# Patient Record
Sex: MALE | URBAN - METROPOLITAN AREA
[De-identification: names, ages, dates, MRNs, and addresses within clinical notes are randomized per-mention and may not be internally consistent; named-entity substitution may affect disease eponyms.]

---

## 2018-03-02 ENCOUNTER — THERAPY VISIT (OUTPATIENT)
Dept: PHYSICAL THERAPY | Facility: CLINIC | Age: 28
End: 2018-03-02
Payer: COMMERCIAL

## 2018-03-02 DIAGNOSIS — M54.6 THORACOLUMBAR BACK PAIN: Primary | ICD-10-CM

## 2018-03-02 DIAGNOSIS — M54.50 THORACOLUMBAR BACK PAIN: Primary | ICD-10-CM

## 2018-03-02 PROCEDURE — 97161 PT EVAL LOW COMPLEX 20 MIN: CPT | Mod: GP | Performed by: PHYSICAL THERAPIST

## 2018-03-02 PROCEDURE — 97014 ELECTRIC STIMULATION THERAPY: CPT | Mod: GP | Performed by: PHYSICAL THERAPIST

## 2018-03-02 PROCEDURE — 97010 HOT OR COLD PACKS THERAPY: CPT | Mod: GP | Performed by: PHYSICAL THERAPIST

## 2018-03-02 PROCEDURE — 97110 THERAPEUTIC EXERCISES: CPT | Mod: GP | Performed by: PHYSICAL THERAPIST

## 2018-03-02 NOTE — LETTER
[facility] ATHLETIC [facility]  [address] [place] [address]  [address]  [facility] [id number] [phone]    2018    Re: Armaan March   :   1990  MRN:  [id number]   REFERRING PHYSICIAN:   Sheila Gaona    [facility] ATHLETIC [facility] PHYSICAL [facility]    Date of Initial Evaluation:  3-2-18  Visits:  Rxs Used: 1  Reason for Referral:  Thoracolumbar back pain    EVALUATION SUMMARY    [facility] Athletic [facility] Initial Evaluation  Subjective:  Patient is a 27 year old male presenting with rehab back hpi, a lumbar condition.   Condition occurred with:  Insidious onset.  Condition occurred: for unknown reasons.  This is a new condition  Patient is referred with c/o acute bilateral T-L pain and abdominal pain. He noted onset of sxs on 18. He has been to the several times, including urgent care and had a CT scan on 3-1-18. Patients reports scan indicated possible bowel obstruction and there is a hx of bowel issues. He has noted a lateral shift in lumbar spine. Pain is worse with bending, sitting, transitions and neck flexion..    Patient reports pain:  Lumbar spine right, lumbar spine left, thoracic spine left, thoracic spine right, central lumbar spine and central thoracic spine.  Radiates to:  Other (abdominal area B).  Pain is described as aching and sharp and is constant and reported as 8/10.  Associated symptoms:  Loss of motion/stiffness. Pain is the same all the time.  Symptoms are exacerbated by twisting, bending, walking, certain positions, sitting and standing and relieved by nothing.  Since onset symptoms are gradually worsening.  Special tests:  CT scan.  General health as reported by patient is good.  Pertinent medical history includes:  Cancer and seizures.  Current medications:  Muscle relaxants, anti-inflammatory, pain medication and sleep medication.  Patient is working in normal job without restrictions.  Primary job  tasks include:  Prolonged sitting, computer work. Pertinent medical history includes:  Cancer, seizures, migraines and sleep disorder/apnea. Other surgeries include:  Cancer surgery (Brain tumor, heart TOS).  Current medications:  Sleep medication, anti-inflammatory, pain medication and muscle relaxants.  Current occupation is [employer]. Primary job tasks include:      Barriers include:  None as reported by the patient.  Red flags:  Changes in bowel and bladder habits.               Objective:  System  Physical Exam    Re: Armaan March   :   1990    Megan Lumbar Evaluation  Posture:  Sitting: good  Standing: fair  Lordosis: Reduced  Lateral Shift: yes and left  Correction of Posture: better  Movement Loss:  Flexion (Flex): major and pain  Extension (EXT): nil  Side Glide R (SG R): min and pain  Side [place] L (SG L): nil and pain  Test Movements:  GAYATHRI: During: increases  After: worse  Mechanical Response: no effect  Repeat GAYATHRI: During: increases  After: worse  Mechanical Response: IncROM  EIL: During: increases  After: no effect  Mechanical Response: no effect  Static Tests:  Lying Prone in Extension: no change in sxs  Conclusion: derangement and other  Principle of Treatment:  Flexion: Trial of SKTC  Extension: Trial of prone lying                                         Musculoskeletal:        Back:                         Re: Armaan March   :   1990    Arms:            ROS    Assessment/Plan:    Patient is a 27 year old male with lumbar complaints.    Patient has the following significant findings with corresponding treatment plan.                Diagnosis 1:  Thoracolumbar pain  Pain -  hot/cold therapy, electric stimulation, manual therapy, self management, education and home program  Decreased ROM/flexibility - manual therapy, therapeutic exercise and home program  Impaired gait - home program  Impaired muscle performance - neuro re-education and home program  Decreased  function - therapeutic activities and home program    Therapy Evaluation Codes:   1) History comprised of:   Personal factors that impact the plan of care:      Anxiety and Overall behavior pattern.    Comorbidity factors that impact the plan of care are:      Cancer.     Medications impacting care: Anti-inflammatory, Muscle relaxant and Pain.  2) Examination of Body Systems comprised of:   Body structures and functions that impact the plan of care:      Lumbar spine and Thoracic Spine.   Activity limitations that impact the plan of care are:      Bending, Driving, Dressing, Reading/Computer work, Sitting and Walking.  3) Clinical presentation characteristics are:   Evolving/Changing.  4) Decision-Making    Moderate complexity using standardized patient assessment instrument and/or measureable assessment of functional outcome.  Cumulative Therapy Evaluation is: Moderate complexity.    Re: Armaan March   :   1990    Communication ability:  Patient appears to be able to clearly communicate and understand verbal and written communication and follow directions correctly.  Treatment Explanation - The following has been discussed with the patient:   RX ordered/plan of care  Anticipated outcomes  Possible risks and side effects  This patient would benefit from PT intervention to resume normal activities.   Rehab potential is fair.  Frequency:  2 X week, once daily  Duration:  for 3 weeks  Discharge Plan:  Achieve all LTG.  Independent in home treatment program.  Reach maximal therapeutic benefit.    Thank you for your referral.    INQUIRIES  Therapist: Vipin Reilly, PT   [facility]  [address] [place] [address]  [address]  [facility] [address]  Phone: [phone]  Fax: [phone]

## 2018-03-02 NOTE — PROGRESS NOTES
[facility] Initial Evaluation  Subjective:  Patient is a 27 year old male presenting with rehab back hpi.   Armaan March is a 27 year old male with a lumbar condition.  Condition occurred with:  Insidious onset.  Condition occurred: for unknown reasons.  This is a new condition  Patient is referred with c/o acute bilateral T-L pain and abdominal pain. He noted onset of sxs on 2-23-18. He has been to the several times, including urgent care and had a CT scan on 3-1-18. Patients reports scan indicated possible bowel obstruction and there is a hx of bowel issues. He has noted a lateral shift in lumbar spine. Pain is worse with bending, sitting, transitions and neck flexion..    Patient reports pain:  Lumbar spine right, lumbar spine left, thoracic spine left, thoracic spine right, central lumbar spine and central thoracic spine.  Radiates to:  Other (abdominal area B).  Pain is described as aching and sharp and is constant and reported as 8/10.  Associated symptoms:  Loss of motion/stiffness. Pain is the same all the time.  Symptoms are exacerbated by twisting, bending, walking, certain positions, sitting and standing and relieved by nothing.  Since onset symptoms are gradually worsening.  Special tests:  CT scan.      General health as reported by patient is good.  Pertinent medical history includes:  Cancer and seizures.      Current medications:  Muscle relaxants, anti-inflammatory, pain medication and sleep medication.    Patient is working in normal job without restrictions.  Primary job tasks include:  Prolonged sitting.    Barriers include:  None as reported by the patient.    Red flags:  Changes in bowel and bladder habits.                        Objective:  System    Physical Exam      Megan Lumbar Evaluation    Posture:  Sitting: good  Standing: fair  Lordosis: Reduced  Lateral Shift: yes and left  Correction of Posture: better    Movement Loss:  Flexion (Flex): major and  pain  Extension (EXT): nil  Side Glide R (SG R): min and pain  Side [place] L (SG L): nil and pain  Test Movements:      GAYATHRI: During: increases  After: worse  Mechanical Response: no effect  Repeat GAYATHRI: During: increases  After: worse  Mechanical Response: IncROM  EIL: During: increases  After: no effect  Mechanical Response: no effect        Static Tests:          Lying Prone in Extension: no change in sxs    Conclusion: derangement and other  Principle of Treatment:    Flexion: Trial of SKTC  Extension: Trial of prone lying                                       Musculoskeletal:        Back:         Arms:      ROS    Assessment/Plan:    Patient is a 27 year old male with lumbar complaints.    Patient has the following significant findings with corresponding treatment plan.                Diagnosis 1:  Thoracolumbar pain  Pain -  hot/cold therapy, electric stimulation, manual therapy, self management, education and home program  Decreased ROM/flexibility - manual therapy, therapeutic exercise and home program  Impaired gait - home program  Impaired muscle performance - neuro re-education and home program  Decreased function - therapeutic activities and home program    Therapy Evaluation Codes:   1) History comprised of:   Personal factors that impact the plan of care:      Anxiety and Overall behavior pattern.    Comorbidity factors that impact the plan of care are:      Cancer.     Medications impacting care: Anti-inflammatory, Muscle relaxant and Pain.  2) Examination of Body Systems comprised of:   Body structures and functions that impact the plan of care:      Lumbar spine and Thoracic Spine.   Activity limitations that impact the plan of care are:      Bending, Driving, Dressing, Reading/Computer work, Sitting and Walking.  3) Clinical presentation characteristics are:   Evolving/Changing.  4) Decision-Making    Moderate complexity using standardized patient assessment instrument and/or measureable assessment of  functional outcome.  Cumulative Therapy Evaluation is: Moderate complexity.    Previous and current functional limitations:  (See Goal Flow Sheet for this information)    Short term and Long term goals: (See Goal Flow Sheet for this information)     Communication ability:  Patient appears to be able to clearly communicate and understand verbal and written communication and follow directions correctly.  Treatment Explanation - The following has been discussed with the patient:   RX ordered/plan of care  Anticipated outcomes  Possible risks and side effects  This patient would benefit from PT intervention to resume normal activities.   Rehab potential is fair.    Frequency:  2 X week, once daily  Duration:  for 3 weeks  Discharge Plan:  Achieve all LTG.  Independent in home treatment program.  Reach maximal therapeutic benefit.    Please refer to the daily flowsheet for treatment today, total treatment time and time spent performing 1:1 timed codes.

## 2018-03-02 NOTE — MR AVS SNAPSHOT
After Visit Summary   3/2/2018    Armaan March    MRN: [id number]           Patient Information     Date Of Birth          1990        Visit Information        Provider Department      3/2/2018 11:20 AM Vipin Reilly, PT [facility]        Today's Diagnoses     Thoracolumbar back pain    -  1       Follow-ups after your visit        Your next 10 appointments already scheduled     Mar 07, 2018  8:40 AM CST   [facility] Spine with Vipin Reilly PT   [facility] Athletic [facility] ([facility] [place])    [address] [place] [address]  [address]  [place] [address]   [phone]            Mar 13, 2018  7:00 AM CDT   [facility]AL[facility] Spine with Vipin Reilly PT   [facility] Athletic [facility] ([facility] [place])    [address] [place] [address]  [address]  [place] [address]   [phone]              Who to contact     If you have questions or need follow up information about today's clinic visit or your schedule please contact [facility] ATHLETIC [facility] PHYSICAL THERAPY directly at [phone].  Normal or non-critical lab and imaging results will be communicated to you by MyChart, letter or phone within 4 business days after the clinic has received the results. If you do not hear from us within 7 days, please contact the clinic through [employer] or phone. If you have a critical or abnormal lab result, we will notify you by phone as soon as possible.  Submit refill requests through dotloop or call your pharmacy and they will forward the refill request to us. Please allow 3 business days for your refill to be completed.          Additional Information About Your Visit        [employer] Information     dotloop lets you send messages to your doctor, view your test results, renew your prescriptions, schedule appointments and more. To sign up, go to [email or web address] . Click on  "\"Log in\" on the left side of the screen, which will take you to the Welcome page. Then click on \"Sign up Now\" on the right side of the page.     You will be asked to enter the access code listed below, as well as some personal information. Please follow the directions to create your username and password.     Your access code is: [email or web address]  Expires: 2018  1:04 PM     Your access code will  in 90 days. If you need help or a new code, please call your [facility] clinic or [phone].        Care EveryWhere ID     This is your Care EveryWhere ID. This could be used by other organizations to access your [facility] medical records  [id number]         Blood Pressure from Last 3 Encounters:   No data found for BP    Weight from Last 3 Encounters:   No data found for Wt              We Performed the Following     ELECTRIC STIMULATION THERAPY     HC PT EVAL, LOW COMPLEXITY     HOT OR COLD PACKS THERAPY     KALI INITIAL EVAL REPORT     THERAPEUTIC EXERCISES        Primary Care Provider Fax #    Physician No Ref-Primary [phone]       No address on file        Equal Access to Services     [facility]: Hadii philly mobley Soangel, waaxda luqadaha, qaybta kaalmaaugusta [place], darinel johnston . So [facility] [phone].    ATENCIÓN: Si habla español, tiene a cabrera disposición servicios gratuitos de asistencia lingüística. Llame al [phone].    We comply with applicable federal civil rights laws and [place] laws. We do not discriminate on the basis of race, color, national origin, age, disability, sex, sexual orientation, or gender identity.            Thank you!     Thank you for choosing [facility] THERAP[facility]  for your care. Our goal is always to provide you with excellent care. Hearing back from our patients is one way we can continue to improve our services. Please take a few minutes to complete the written survey that you may receive in the " mail after your visit with us. Thank you!             Your Updated Medication List - Protect others around you: Learn how to safely use, store and throw away your medicines at www.disposemymeds.org.      Notice  As of 3/2/2018  1:04 PM    You have not been prescribed any medications.

## 2018-03-02 NOTE — PROGRESS NOTES
[facility] [facility] Initial Evaluation  Subjective:  Patient is a 27 year old male presenting with rehab left ankle/foot hpi.                                      Pertinent medical history includes:  Cancer, seizures, migraines and sleep disorder/apnea.    Other surgeries include:  Cancer surgery (Brain tumor, heart TOS).  Current medications:  Sleep medication, anti-inflammatory, pain medication and muscle relaxants.  Current occupation is [employer].    Primary job tasks include:  Prolonged sitting (computer work).                                Objective:  System    Physical Exam    General     ROS    Assessment/Plan:

## 2018-03-07 ENCOUNTER — THERAPY VISIT (OUTPATIENT)
Dept: PHYSICAL THERAPY | Facility: CLINIC | Age: 28
End: 2018-03-07
Payer: COMMERCIAL

## 2018-03-07 DIAGNOSIS — M54.6 THORACOLUMBAR BACK PAIN: ICD-10-CM

## 2018-03-07 DIAGNOSIS — M54.50 THORACOLUMBAR BACK PAIN: ICD-10-CM

## 2018-03-07 PROCEDURE — 97014 ELECTRIC STIMULATION THERAPY: CPT | Mod: GP | Performed by: PHYSICAL THERAPIST

## 2018-03-07 PROCEDURE — 97140 MANUAL THERAPY 1/> REGIONS: CPT | Mod: GP | Performed by: PHYSICAL THERAPIST

## 2018-03-07 PROCEDURE — 97010 HOT OR COLD PACKS THERAPY: CPT | Mod: GP | Performed by: PHYSICAL THERAPIST

## 2018-03-07 PROCEDURE — 97110 THERAPEUTIC EXERCISES: CPT | Mod: GP | Performed by: PHYSICAL THERAPIST

## 2018-03-13 ENCOUNTER — THERAPY VISIT (OUTPATIENT)
Dept: PHYSICAL THERAPY | Facility: CLINIC | Age: 28
End: 2018-03-13
Payer: COMMERCIAL

## 2018-03-13 DIAGNOSIS — M54.50 THORACOLUMBAR BACK PAIN: ICD-10-CM

## 2018-03-13 DIAGNOSIS — M54.6 THORACOLUMBAR BACK PAIN: ICD-10-CM

## 2018-03-13 PROCEDURE — 97110 THERAPEUTIC EXERCISES: CPT | Mod: GP | Performed by: PHYSICAL THERAPIST

## 2018-03-13 PROCEDURE — 97014 ELECTRIC STIMULATION THERAPY: CPT | Mod: GP | Performed by: PHYSICAL THERAPIST

## 2018-03-13 PROCEDURE — 97010 HOT OR COLD PACKS THERAPY: CPT | Mod: GP | Performed by: PHYSICAL THERAPIST

## 2018-03-13 PROCEDURE — 97112 NEUROMUSCULAR REEDUCATION: CPT | Mod: GP | Performed by: PHYSICAL THERAPIST
